# Patient Record
Sex: FEMALE | Race: WHITE | NOT HISPANIC OR LATINO | URBAN - METROPOLITAN AREA
[De-identification: names, ages, dates, MRNs, and addresses within clinical notes are randomized per-mention and may not be internally consistent; named-entity substitution may affect disease eponyms.]

---

## 2023-10-26 ENCOUNTER — TELEPHONE (OUTPATIENT)
Dept: OBSTETRICS AND GYNECOLOGY | Facility: CLINIC | Age: 53
End: 2023-10-26

## 2023-10-26 DIAGNOSIS — N32.81 OVERACTIVE BLADDER: Primary | ICD-10-CM

## 2023-10-27 RX ORDER — OXYBUTYNIN CHLORIDE 10 MG/1
10 TABLET, EXTENDED RELEASE ORAL DAILY
Qty: 60 TABLET | Refills: 5 | Status: SHIPPED | OUTPATIENT
Start: 2023-10-27 | End: 2024-10-26

## 2024-04-17 ENCOUNTER — TELEPHONE (OUTPATIENT)
Dept: OBSTETRICS AND GYNECOLOGY | Facility: CLINIC | Age: 54
End: 2024-04-17
Payer: MEDICAID

## 2024-04-17 DIAGNOSIS — F41.9 ANXIETY: Primary | ICD-10-CM

## 2024-04-17 RX ORDER — SERTRALINE HYDROCHLORIDE 100 MG/1
100 TABLET, FILM COATED ORAL DAILY
COMMUNITY

## 2024-04-17 RX ORDER — SERTRALINE HYDROCHLORIDE 100 MG/1
100 TABLET, FILM COATED ORAL DAILY
Qty: 30 TABLET | Refills: 11 | Status: SHIPPED | OUTPATIENT
Start: 2024-04-17 | End: 2024-04-22 | Stop reason: SDUPTHER

## 2024-04-22 ENCOUNTER — OFFICE VISIT (OUTPATIENT)
Dept: OBSTETRICS AND GYNECOLOGY | Facility: CLINIC | Age: 54
End: 2024-04-22
Payer: MEDICAID

## 2024-04-22 VITALS
DIASTOLIC BLOOD PRESSURE: 64 MMHG | HEIGHT: 64 IN | WEIGHT: 128 LBS | BODY MASS INDEX: 21.85 KG/M2 | SYSTOLIC BLOOD PRESSURE: 104 MMHG

## 2024-04-22 DIAGNOSIS — Z79.890 ENCOUNTER FOR MONITORING POSTMENOPAUSAL ESTROGEN REPLACEMENT THERAPY: Primary | ICD-10-CM

## 2024-04-22 DIAGNOSIS — F41.9 ANXIETY: ICD-10-CM

## 2024-04-22 DIAGNOSIS — Z12.31 SCREENING MAMMOGRAM FOR BREAST CANCER: ICD-10-CM

## 2024-04-22 DIAGNOSIS — Z51.81 ENCOUNTER FOR MONITORING POSTMENOPAUSAL ESTROGEN REPLACEMENT THERAPY: Primary | ICD-10-CM

## 2024-04-22 DIAGNOSIS — Z12.4 CERVICAL CANCER SCREENING: ICD-10-CM

## 2024-04-22 DIAGNOSIS — N32.81 OVERACTIVE BLADDER: ICD-10-CM

## 2024-04-22 PROCEDURE — 99396 PREV VISIT EST AGE 40-64: CPT | Performed by: OBSTETRICS & GYNECOLOGY

## 2024-04-22 PROCEDURE — 88175 CYTOPATH C/V AUTO FLUID REDO: CPT

## 2024-04-22 PROCEDURE — 87624 HPV HI-RISK TYP POOLED RSLT: CPT

## 2024-04-22 RX ORDER — FOLIC ACID 1 MG/1
1 TABLET ORAL DAILY
COMMUNITY

## 2024-04-22 RX ORDER — ESTRADIOL 1 MG/1
1 TABLET ORAL DAILY
COMMUNITY

## 2024-04-22 RX ORDER — SERTRALINE HYDROCHLORIDE 100 MG/1
100 TABLET, FILM COATED ORAL DAILY
Qty: 30 TABLET | Refills: 11 | Status: SHIPPED | OUTPATIENT
Start: 2024-04-22 | End: 2025-04-22

## 2024-04-22 RX ORDER — METHOTREXATE 2.5 MG/1
2.5 TABLET ORAL
COMMUNITY

## 2024-04-22 RX ORDER — OXYBUTYNIN CHLORIDE 10 MG/1
10 TABLET, EXTENDED RELEASE ORAL DAILY
Qty: 60 TABLET | Refills: 5 | Status: SHIPPED | OUTPATIENT
Start: 2024-04-22 | End: 2025-04-22

## 2024-04-22 RX ORDER — TRAZODONE HYDROCHLORIDE 50 MG/1
50 TABLET ORAL NIGHTLY
COMMUNITY

## 2024-04-22 RX ORDER — ESTRADIOL 1 MG/1
1 TABLET ORAL DAILY
Qty: 90 TABLET | Refills: 3 | Status: SHIPPED | OUTPATIENT
Start: 2024-04-22 | End: 2025-04-22

## 2024-04-22 RX ORDER — HYDROXYCHLOROQUINE SULFATE 200 MG/1
200 TABLET, FILM COATED ORAL DAILY
COMMUNITY

## 2024-04-22 NOTE — PROGRESS NOTES
Subjective   Patient ID: Kerri Trivedi is a 53 y.o. female who presents for Well woman visit.  Review of last year's note,     Arvind Albrecht MD  Physician  Specialty: Obstetrics and Gynecology     Progress Notes      Signed     Encounter Date: 3/21/2023    Signed           Orders     · Renew: Sertraline HCl - 100 MG Oral Tablet; take 1 tablet by mouth daily as directed     · IO UA (nonautomated w/o microscopy); Status:Resulted - Requires  Verification,Retrospective Authorization;   Done: 21Mar2023 03:03PM     · Renew: Estradiol 1 MG Oral Tablet; TAKE ONE TABLET BY MOUTH EVERY DAY     Patient Discussion/Summary     Established patient annual exam. 52 years old. Supracervical hysterectomy removal of right adnexa 2005. LEEP 2009.     No new worries or concerns. Breast abdominal pelvic exams normal. Requisition mammogram. Recommend colonoscopy. She states she prefers not to have one. Pap smear in 2024     Well woman exam. Follow-up 1 year      Chief Complaint     Here for annual exam.      Review of Systems  Constitutional: no fever, no chills, no recent weight gain, no recent weight loss and no fatigue.   Eyes: no eye pain, no vision problems and no dryness of the eyes.   ENT: no hearing loss, no nosebleeds and no sinus congestion.   Cardiovascular: no chest pain, no palpitations and no orthopnea.   Respiratory: no shortness of breath, no cough and no wheezing.   Gastrointestinal: no abdominal pain, no constipation, no nausea, no diarrhea and no vomiting.   Genitourinary: no dysuria, no urinary incontinence, no vaginal dryness, no vaginal itching, no dyspareunia, no pelvic pain, no dysmenorrhea, no sexual problems, no change in urinary frequency, no vaginal discharge, no unexplained vaginal bleeding and no lesion/sore.   Musculoskeletal: no back pain, no joint swelling and no leg edema.   Integumentary: no rashes, no skin lesions, no nipple discharge, no breast pain and no breast lump.   Neurological: no headache, no  numbness and no dizziness.   Psychiatric: no sleep disturbances, no anxiety and no depression.   Endocrine: no hot flashes, no loss of hair and no hirsutism.   Hematologic/Lymphatic: no swollen glands, no tendency for easy bleeding and no tendency for easy bruising.   All other systems have been reviewed and are negative for complaint.      Active Problems  Problems    · Acute sinusitis (461.9) (J01.90)   · Anxiety (300.00) (F41.9)   · Cervical smear, as part of routine gynecological examination (V76.2) (Z01.419)   · Corneal scar (371.00) (H17.9)   · Depression (311) (F32.A)   · Deviated septum (470) (J34.2)   · Dyslipidemia (272.4) (E78.5)   · Emphysema, compensatory (518.2) (J98.3)   · Encounter for screening colonoscopy (V76.51) (Z12.11)   · Fibromyalgia (729.1) (M79.7)   · Hematuria (599.70) (R31.9)   · Influenza vaccination declined (V64.06) (Z28.21)   · Long-term use of Plaquenil (V58.69) (Z79.899)   · Menopause (627.2) (Z78.0)   · Added by Problem List Migration; 2013   · Migraines (346.90) (G43.909)   · Oral thrush (112.0) (B37.0)   · Osteoarthritis (715.90) (M19.90)   · Added by Problem List Migration; 2013   · Osteopenia (733.90) (M85.80)   · Overactive bladder (596.51) (N32.81)   · Recurrent sinusitis (473.9) (J32.9)   · Rheumatoid arthritis (714.0) (M06.9)   · Added by Problem List Migration; 2013   · Urinary tract infection (599.0) (N39.0)   · Visit for screening mammogram (V76.12) (Z12.31)   · Yeast infection (112.9) (B37.9)     Past Medical History  Problems    · History of depression (V11.8) (Z86.59)   · History of endometriosis (V13.29) (Z87.42)   · History of hematuria (V13.09) (Z87.448)   · Resolved Date: 31 Oct 2018   · History of rheumatoid arthritis (V13.4) (Z87.39)     Surgical History  Problems    · History of Arthroscopy Knee Right   · History of Cervical Loop Electrosurgical Excision (LEEP)   · History of  Section   · History of Hysterectomy   · History of Neuroplasty  Median Nerve At Carpal Tunnel     Family History  Father    · Family history of Diabetes mellitus due to underlying condition with hyperglycemia,  unspecified whether long term insulin use   · Family history of cardiovascular disease (V17.49) (Z82.49)   · Family history of lung cancer (V16.1) (Z80.1)   · Family history of rheumatoid arthritis (V17.7) (Z82.61)  Sibling    · Family history of lupus erythematosus (V19.4) (Z84.0)   · Family history of malignant neoplasm of breast (V16.3) (Z80.3)   · Family history of malignant neoplasm of ovary (V16.41) (Z80.41)   · Family history of malignant neoplasm of uterus (V16.49) (Z80.49)  Paternal Grandfather    · Family history of rheumatoid arthritis (V17.7) (Z82.61)     Social History  Problems    · Consumes alcohol occasionally (V49.89) (Z78.9)   · Current smoker (305.1) (F17.200)   · History of Current smoker (305.1) (F17.200)   · smokes 0.5ppd x 10 years   · Daily caffeine consumption, 4-5 servings a day   · No illicit drug use     Allergies  Medication    · codeine   Recorded By: Jessica Mancilla; 2/26/2015 2:08:23 PM   · Percocet TABS   Recorded By: Jacklyn Claire; 10/31/2018 10:31:36 AM   · Statins   Recorded By: Jacklyn Claire; 10/31/2018 10:31:36 AM     Current Meds       Medication Name Instruction  Alendronate Sodium 70 MG Oral Tablet    Aleve 220 MG Oral Capsule TAKE CAPSULE  prn  Ca-Plus TABS    Disability Placard Diability Placard      Good for 5 years  Estradiol 1 MG Oral Tablet TAKE ONE TABLET BY MOUTH EVERY DAY  Fluconazole 150 MG Oral Tablet TAKE 3 TABLET Every other day  Fluticasone Propionate 50 MCG/ACT Nasal Suspension SHAKE LIQUID AND USE 1 SPRAY IN EACH NOSTRIL EVERY DAY  Folic Acid 1 MG Oral Tablet TAKE ONE TABLET BY MOUTH EVERY DAY  Humira 40 MG/0.8ML KIT    Humira 40 MG/0.8ML Subcutaneous Prefilled Syringe Kit    Loratadine 10 MG Oral Tablet TAKE 1 TABLET BY MOUTH EVERY DAY  Methotrexate 2.5 MG Oral Tablet    Multi Complete CAPS     Myrbetriq 50 MG Oral Tablet Extended Release 24 Hour TAKE 1 TABLET BY MOUTH EVERY DAY  Nystatin 914434 UNIT/ML Mouth/Throat Suspension SWISH AND SWALLOW  5 ML 3 TIMES DAILY for 4 days  Oxybutynin Chloride ER 10 MG Oral Tablet Extended Release 24 Hour Take 1 tablet daily  Plaquenil 200 MG Oral Tablet    Sertraline HCl - 100 MG Oral Tablet take 1 tablet by mouth daily as directed  Solifenacin Succinate 10 MG Oral Tablet TAKE ONE TABLET BY MOUTH EVERY DAY  sulfaSALAzine 500 MG Oral Tablet    traZODone HCl - 50 MG Oral Tablet       Vitals  Vital Signs       Recorded: 2023 03:06PM  Systolic 98  Diastolic 60  Height 5 ft 5 in  Weight 128 lb   BMI Calculated 21.3 kg/m2  BSA Calculated 1.64  LMP hyster   2  Para 2     Physical Exam     Constitutional: Alert and in no acute distress. Well developed, well nourished   Head and Face: Head and face: normal   Eyes: Normal external exam - nonicteric sclera, extraocular movements intact (EOMI) and no ptosis.   Ears, Nose, Mouth, and Throat: External inspection of ears and nose: normal   Neck: no neck asymmetry. Supple and thyroid not enlarged and there were no palpable thyroid nodules   Cardiovascular: Heart rate and rhythm were normal, normal S1 and S2, no gallops, and no murmurs   Pulmonary: No respiratory distress and clear bilateral breath sounds   Chest: Breasts: normal appearance, no nipple discharge and no skin changes and palpation of breasts and axillae: no palpable mass and no axillary lymphadenopathy   Abdomen: soft nontender; no abdominal mass palpated, no organomegaly and no hernias   Genitourinary: external genitalia: normal, no inguinal lymphadenopathy, Bartholin's urethral and [place]'s glands: normal, urethra: normal, bladder: normal on palpation and perianal area: normal   Vagina: normal.   Cervix: Normal.   Uterus: Normal.   Right Adnexa/parametria: Normal.   Left Adnexa/parametria: Normal.   Musculoskeletal: no joint swelling seen, normal movements  of all extremities   Skin: normal skin color and pigmentation, normal skin turgor, and no rash.   Neurologic: cranial nerves: non-focal. grossly intact   Psychiatric: alert and oriented x 3., affect normal to patient baseline and mood: appropriate      Results/Data    IO UA (nonautomated w/o microscopy) 21Mar2023 03:03PM Arvind Albrecht       Test Name Result Flag Reference  IO Glucose - Urine Negative      IO Blood Trace      IO Protein, Urine Negative      IO Nitrite, Urine Negative      IO Leukocytes Negative      IO Glucose - Urine Negative      IO Blood Trace      IO Protein, Urine Negative      IO Nitrite, Urine Negative      IO Leukocytes Negative                     Signatures   Electronically signed by : Arvind Albrecht MD; Mar 21 2023  3:23PM EST (Author)          For annual exam.  No new worries or concerns.  In a very happy relationship.  Needs refill of 3 medications.    Exam breast abdominal pelvic exams normal.  Pap smear obtained.  Mammogram ordered.  Refill estradiol, oxybutynin, sertraline          Review of Systems   All other systems reviewed and are negative.      Objective   Physical Exam  Constitutional:       Appearance: Normal appearance. She is normal weight.   Chest:   Breasts:     Right: Normal.      Left: Normal.   Genitourinary:     General: Normal vulva.      Vagina: Normal.      Cervix: Normal.      Adnexa: Right adnexa normal and left adnexa normal.   Neurological:      Mental Status: She is alert.         Assessment/Plan   Well woman exam.  Pap smear obtained.  Mammogram ordered.  Refill meds         Arvind Albrecht MD 04/22/24 12:55 PM

## 2025-03-20 ENCOUNTER — TELEPHONE (OUTPATIENT)
Dept: OBSTETRICS AND GYNECOLOGY | Facility: CLINIC | Age: 55
End: 2025-03-20
Payer: MEDICAID

## 2025-03-20 DIAGNOSIS — N32.81 OVERACTIVE BLADDER: Primary | ICD-10-CM

## 2025-03-24 RX ORDER — OXYBUTYNIN CHLORIDE 10 MG/1
10 TABLET, EXTENDED RELEASE ORAL DAILY
Qty: 60 TABLET | Refills: 5 | Status: SHIPPED | OUTPATIENT
Start: 2025-03-24 | End: 2026-03-24

## 2025-05-05 ENCOUNTER — APPOINTMENT (OUTPATIENT)
Dept: OBSTETRICS AND GYNECOLOGY | Facility: CLINIC | Age: 55
End: 2025-05-05
Payer: MEDICAID

## 2025-05-05 VITALS
SYSTOLIC BLOOD PRESSURE: 110 MMHG | DIASTOLIC BLOOD PRESSURE: 74 MMHG | BODY MASS INDEX: 22.82 KG/M2 | WEIGHT: 137 LBS | HEIGHT: 65 IN

## 2025-05-05 DIAGNOSIS — Z79.890 HORMONE REPLACEMENT THERAPY: ICD-10-CM

## 2025-05-05 DIAGNOSIS — F41.9 ANXIETY: ICD-10-CM

## 2025-05-05 DIAGNOSIS — N32.81 OVERACTIVE BLADDER: ICD-10-CM

## 2025-05-05 DIAGNOSIS — Z01.419 WELL WOMAN EXAM: Primary | ICD-10-CM

## 2025-05-05 DIAGNOSIS — Z12.31 SCREENING MAMMOGRAM, ENCOUNTER FOR: ICD-10-CM

## 2025-05-05 PROCEDURE — 99396 PREV VISIT EST AGE 40-64: CPT | Performed by: OBSTETRICS & GYNECOLOGY

## 2025-05-05 PROCEDURE — 3008F BODY MASS INDEX DOCD: CPT | Performed by: OBSTETRICS & GYNECOLOGY

## 2025-05-05 RX ORDER — OXYBUTYNIN CHLORIDE 10 MG/1
10 TABLET, EXTENDED RELEASE ORAL DAILY
Qty: 90 TABLET | Refills: 3 | Status: SHIPPED | OUTPATIENT
Start: 2025-05-05 | End: 2026-05-05

## 2025-05-05 RX ORDER — ESTRADIOL 1 MG/1
1 TABLET ORAL DAILY
Qty: 90 TABLET | Refills: 3 | Status: SHIPPED | OUTPATIENT
Start: 2025-05-05 | End: 2026-05-05

## 2025-05-05 RX ORDER — SERTRALINE HYDROCHLORIDE 100 MG/1
100 TABLET, FILM COATED ORAL DAILY
Qty: 90 TABLET | Refills: 3 | Status: SHIPPED | OUTPATIENT
Start: 2025-05-05 | End: 2026-05-05

## 2025-05-05 NOTE — PROGRESS NOTES
Subjective   Patient ID: Kerri Trivedi is a 54 y.o. female who presents for annual and refills.  Review of annual note from 1 year ago, Pap smear was normal.     Arvind Albrecht MD  Physician  Obstetrics     Progress Notes      Signed     Encounter Date: 4/22/2024    Signed     Expand All Collapse All    Subjective  Patient ID: Kerri Trivedi is a 53 y.o. female who presents for Well woman visit.  Review of last year's note,     Arvind Albrecht MD  Physician  Specialty: Obstetrics and Gynecology     Progress Notes      Signed     Encounter Date: 3/21/2023     Signed             Orders     · Renew: Sertraline HCl - 100 MG Oral Tablet; take 1 tablet by mouth daily as directed     · IO UA (nonautomated w/o microscopy); Status:Resulted - Requires  Verification,Retrospective Authorization;   Done: 21Mar2023 03:03PM     · Renew: Estradiol 1 MG Oral Tablet; TAKE ONE TABLET BY MOUTH EVERY DAY     Patient Discussion/Summary     Established patient annual exam. 52 years old. Supracervical hysterectomy removal of right adnexa 2005. LEEP 2009.     No new worries or concerns. Breast abdominal pelvic exams normal. Requisition mammogram. Recommend colonoscopy. She states she prefers not to have one. Pap smear in 2024     Well woman exam. Follow-up 1 year      Chief Complaint     Here for annual exam.      Review of Systems  Constitutional: no fever, no chills, no recent weight gain, no recent weight loss and no fatigue.   Eyes: no eye pain, no vision problems and no dryness of the eyes.   ENT: no hearing loss, no nosebleeds and no sinus congestion.   Cardiovascular: no chest pain, no palpitations and no orthopnea.   Respiratory: no shortness of breath, no cough and no wheezing.   Gastrointestinal: no abdominal pain, no constipation, no nausea, no diarrhea and no vomiting.   Genitourinary: no dysuria, no urinary incontinence, no vaginal dryness, no vaginal itching, no dyspareunia, no pelvic pain, no dysmenorrhea, no sexual problems, no  change in urinary frequency, no vaginal discharge, no unexplained vaginal bleeding and no lesion/sore.   Musculoskeletal: no back pain, no joint swelling and no leg edema.   Integumentary: no rashes, no skin lesions, no nipple discharge, no breast pain and no breast lump.   Neurological: no headache, no numbness and no dizziness.   Psychiatric: no sleep disturbances, no anxiety and no depression.   Endocrine: no hot flashes, no loss of hair and no hirsutism.   Hematologic/Lymphatic: no swollen glands, no tendency for easy bleeding and no tendency for easy bruising.   All other systems have been reviewed and are negative for complaint.      Active Problems  Problems    · Acute sinusitis (461.9) (J01.90)   · Anxiety (300.00) (F41.9)   · Cervical smear, as part of routine gynecological examination (V76.2) (Z01.419)   · Corneal scar (371.00) (H17.9)   · Depression (311) (F32.A)   · Deviated septum (470) (J34.2)   · Dyslipidemia (272.4) (E78.5)   · Emphysema, compensatory (518.2) (J98.3)   · Encounter for screening colonoscopy (V76.51) (Z12.11)   · Fibromyalgia (729.1) (M79.7)   · Hematuria (599.70) (R31.9)   · Influenza vaccination declined (V64.06) (Z28.21)   · Long-term use of Plaquenil (V58.69) (Z79.899)   · Menopause (627.2) (Z78.0)   · Added by Problem List Migration; 2013-12-8   · Migraines (346.90) (G43.909)   · Oral thrush (112.0) (B37.0)   · Osteoarthritis (715.90) (M19.90)   · Added by Problem List Migration; 2013-12-8   · Osteopenia (733.90) (M85.80)   · Overactive bladder (596.51) (N32.81)   · Recurrent sinusitis (473.9) (J32.9)   · Rheumatoid arthritis (714.0) (M06.9)   · Added by Problem List Migration; 2013-12-8   · Urinary tract infection (599.0) (N39.0)   · Visit for screening mammogram (V76.12) (Z12.31)   · Yeast infection (112.9) (B37.9)     Past Medical History  Problems    · History of depression (V11.8) (Z86.59)   · History of endometriosis (V13.29) (Z87.42)   · History of hematuria (V13.09)  (Z87.448)   · Resolved Date: 31 Oct 2018   · History of rheumatoid arthritis (V13.4) (Z87.39)     Surgical History  Problems    · History of Arthroscopy Knee Right   · History of Cervical Loop Electrosurgical Excision (LEEP)   · History of  Section   · History of Hysterectomy   · History of Neuroplasty Median Nerve At Carpal Tunnel     Family History  Father    · Family history of Diabetes mellitus due to underlying condition with hyperglycemia,  unspecified whether long term insulin use   · Family history of cardiovascular disease (V17.49) (Z82.49)   · Family history of lung cancer (V16.1) (Z80.1)   · Family history of rheumatoid arthritis (V17.7) (Z82.61)  Sibling    · Family history of lupus erythematosus (V19.4) (Z84.0)   · Family history of malignant neoplasm of breast (V16.3) (Z80.3)   · Family history of malignant neoplasm of ovary (V16.41) (Z80.41)   · Family history of malignant neoplasm of uterus (V16.49) (Z80.49)  Paternal Grandfather    · Family history of rheumatoid arthritis (V17.7) (Z82.61)     Social History  Problems    · Consumes alcohol occasionally (V49.89) (Z78.9)   · Current smoker (305.1) (F17.200)   · History of Current smoker (305.1) (F17.200)   · smokes 0.5ppd x 10 years   · Daily caffeine consumption, 4-5 servings a day   · No illicit drug use     Allergies  Medication    · codeine   Recorded By: Jessica Mancilla; 2015 2:08:23 PM   · Percocet TABS   Recorded By: Jacklyn Claire; 10/31/2018 10:31:36 AM   · Statins   Recorded By: Jacklyn Claire; 10/31/2018 10:31:36 AM     Current Meds        Medication NameInstruction  Alendronate Sodium 70 MG Oral Tablet            Aleve 220 MG Oral Capsule    TAKE CAPSULE  prn  Ca-Plus TABS      Disability Placard         Diability Placard      Good for 5 years  Estradiol 1 MG Oral TabletTAKE ONE TABLET BY MOUTH EVERY DAY  Fluconazole 150 MG Oral TabletTAKE 3 TABLET Every other day  Fluticasone Propionate 50 MCG/ACT Nasal  SuspensionSHAKE LIQUID AND USE 1 SPRAY IN EACH NOSTRIL EVERY DAY  Folic Acid 1 MG Oral TabletTAKE ONE TABLET BY MOUTH EVERY DAY  Humira 40 MG/0.8ML KIT           Humira 40 MG/0.8ML Subcutaneous Prefilled Syringe Kit        Loratadine 10 MG Oral TabletTAKE 1 TABLET BY MOUTH EVERY DAY  Methotrexate 2.5 MG Oral Tablet          Multi Complete CAPS     Myrbetriq 50 MG Oral Tablet Extended Release 24 HourTAKE 1 TABLET BY MOUTH EVERY DAY  Nystatin 527925 UNIT/ML Mouth/Throat Suspension           SWISH AND SWALLOW  5 ML 3 TIMES DAILY for 4 days  Oxybutynin Chloride ER 10 MG Oral Tablet Extended Release 24 HourTake 1 tablet daily  Plaquenil 200 MG Oral Tablet    Sertraline HCl - 100 MG Oral Tablettake 1 tablet by mouth daily as directed  Solifenacin Succinate 10 MG Oral TabletTAKE ONE TABLET BY MOUTH EVERY DAY  sulfaSALAzine 500 MG Oral Tablet        traZODone HCl - 50 MG Oral Tablet         Vitals  Vital Signs                  Recorded: 21Mar2023 03:06PM  [id number]  [id number]  Height5 ft 5 in  Weight 128 lb   BMI Gdvmbxixpg79.3 kg/m2  BSA Calculated1.64  LMPhyster  Gravida2  Para2     Physical Exam     Constitutional: Alert and in no acute distress. Well developed, well nourished   Head and Face: Head and face: normal   Eyes: Normal external exam - nonicteric sclera, extraocular movements intact (EOMI) and no ptosis.   Ears, Nose, Mouth, and Throat: External inspection of ears and nose: normal   Neck: no neck asymmetry. Supple and thyroid not enlarged and there were no palpable thyroid nodules   Cardiovascular: Heart rate and rhythm were normal, normal S1 and S2, no gallops, and no murmurs   Pulmonary: No respiratory distress and clear bilateral breath sounds   Chest: Breasts: normal appearance, no nipple discharge and no skin changes and palpation of breasts and axillae: no palpable mass and no axillary lymphadenopathy   Abdomen: soft nontender; no abdominal mass palpated, no organomegaly and no hernias   Genitourinary:  external genitalia: normal, no inguinal lymphadenopathy, Bartholin's urethral and [place]'s glands: normal, urethra: normal, bladder: normal on palpation and perianal area: normal   Vagina: normal.   Cervix: Normal.   Uterus: Normal.   Right Adnexa/parametria: Normal.   Left Adnexa/parametria: Normal.   Musculoskeletal: no joint swelling seen, normal movements of all extremities   Skin: normal skin color and pigmentation, normal skin turgor, and no rash.   Neurologic: cranial nerves: non-focal. grossly intact   Psychiatric: alert and oriented x 3., affect normal to patient baseline and mood: appropriate      Results/Data     IO UA (nonautomated w/o microscopy)21Mar2023 03:03PMArvind Albrecht        Test NameResultFlagReference  IO Glucose - Urine       Negative                         IO Blood          Trace                   IO Protein, Urine          Negative                         IO Nitrite, Urine           Negative                         IO Leukocytes  Negative                         IO Glucose - Urine       Negative                         IO Blood          Trace                   IO Protein, Urine          Negative                         IO Nitrite, Urine           Negative                         IO Leukocytes  Negative                                                                      Signatures   Electronically signed by : Arvind Albrecht MD; Mar 21 2023  3:23PM EST (Author)              For annual exam.  No new worries or concerns.  In a very happy relationship.  Needs refill of 3 medications.     Exam breast abdominal pelvic exams normal.  Pap smear obtained.  Mammogram ordered.  Refill estradiol, oxybutynin, sertraline              Review of Systems   All other systems reviewed and are negative.        Objective  Physical Exam  Constitutional:       Appearance: Normal appearance. She is normal weight.   Chest:   Breasts:     Right: Normal.      Left: Normal.   Genitourinary:     General: Normal vulva.       Vagina: Normal.      Cervix: Normal.      Adnexa: Right adnexa normal and left adnexa normal.   Neurological:      Mental Status: She is alert.            Assessment/Plan  Well woman exam.  Pap smear obtained.  Mammogram ordered.  Refill meds        Arvind Albrecht MD 04/22/24 12:55 PM         Electronically signed by Arvind Albrecht MD at 4/22/2024  1:03 PM    Prior supracervical hysterectomy.    On exam breast abdominal pelvic exams normal.  Well woman exam.  Refilled meds.  Mammogram.  Follow-up 1 year  No new worries or concerns.  She does see a rheumatologist for her arthritis.  Refill her Ditropan XL 10 mg, sertraline 100 mg, estradiol 1 mg.        Review of Systems   All other systems reviewed and are negative.      Objective   Physical Exam  Constitutional:       Appearance: Normal appearance. She is normal weight.   Chest:   Breasts:     Right: Normal.      Left: Normal.   Genitourinary:     General: Normal vulva.      Vagina: Normal.      Cervix: Normal.      Adnexa: Right adnexa normal and left adnexa normal.   Neurological:      Mental Status: She is alert.         Assessment/Plan   Well woman exam.  Mammogram.  Prior supracervical hysterectomy.  Refill oxybutynin XL 10 mg, sertraline 100 mg, estradiol 1 mg.  Follow-up 1 year         Arvind Albrecht MD 05/05/25 12:59 PM

## 2025-06-23 ENCOUNTER — HOSPITAL ENCOUNTER (OUTPATIENT)
Dept: RADIOLOGY | Facility: HOSPITAL | Age: 55
Discharge: HOME | End: 2025-06-23
Payer: MEDICAID

## 2025-06-23 VITALS — HEIGHT: 65 IN | WEIGHT: 137 LBS | BODY MASS INDEX: 22.82 KG/M2

## 2025-06-23 DIAGNOSIS — Z12.31 SCREENING MAMMOGRAM, ENCOUNTER FOR: ICD-10-CM

## 2025-06-23 PROCEDURE — 77067 SCR MAMMO BI INCL CAD: CPT | Performed by: RADIOLOGY

## 2025-06-23 PROCEDURE — 77063 BREAST TOMOSYNTHESIS BI: CPT | Performed by: RADIOLOGY

## 2025-06-23 PROCEDURE — 77067 SCR MAMMO BI INCL CAD: CPT

## 2025-07-10 ENCOUNTER — HOSPITAL ENCOUNTER (OUTPATIENT)
Dept: RADIOLOGY | Facility: HOSPITAL | Age: 55
Discharge: HOME | End: 2025-07-10
Payer: MEDICAID

## 2025-07-10 DIAGNOSIS — R92.8 ABNORMAL MAMMOGRAM: ICD-10-CM

## 2025-07-10 PROCEDURE — 77065 DX MAMMO INCL CAD UNI: CPT | Mod: RIGHT SIDE | Performed by: STUDENT IN AN ORGANIZED HEALTH CARE EDUCATION/TRAINING PROGRAM

## 2025-07-10 PROCEDURE — 76983 USE EA ADDL TARGET LESION: CPT

## 2025-07-10 PROCEDURE — 77061 BREAST TOMOSYNTHESIS UNI: CPT | Mod: RT

## 2025-07-10 PROCEDURE — 76642 ULTRASOUND BREAST LIMITED: CPT | Mod: RIGHT SIDE | Performed by: STUDENT IN AN ORGANIZED HEALTH CARE EDUCATION/TRAINING PROGRAM

## 2025-07-10 PROCEDURE — 77061 BREAST TOMOSYNTHESIS UNI: CPT | Mod: RIGHT SIDE | Performed by: STUDENT IN AN ORGANIZED HEALTH CARE EDUCATION/TRAINING PROGRAM

## 2025-07-10 PROCEDURE — 76642 ULTRASOUND BREAST LIMITED: CPT

## 2026-05-11 ENCOUNTER — APPOINTMENT (OUTPATIENT)
Dept: OBSTETRICS AND GYNECOLOGY | Facility: CLINIC | Age: 56
End: 2026-05-11
Payer: MEDICAID